# Patient Record
Sex: MALE | Race: WHITE | NOT HISPANIC OR LATINO | Employment: FULL TIME | URBAN - METROPOLITAN AREA
[De-identification: names, ages, dates, MRNs, and addresses within clinical notes are randomized per-mention and may not be internally consistent; named-entity substitution may affect disease eponyms.]

---

## 2018-08-07 ENCOUNTER — HOSPITAL ENCOUNTER (EMERGENCY)
Facility: MEDICAL CENTER | Age: 43
End: 2018-08-07
Attending: EMERGENCY MEDICINE
Payer: MEDICAID

## 2018-08-07 VITALS
HEART RATE: 104 BPM | BODY MASS INDEX: 27.41 KG/M2 | TEMPERATURE: 97.5 F | WEIGHT: 185.63 LBS | DIASTOLIC BLOOD PRESSURE: 101 MMHG | OXYGEN SATURATION: 97 % | SYSTOLIC BLOOD PRESSURE: 144 MMHG | RESPIRATION RATE: 16 BRPM

## 2018-08-07 DIAGNOSIS — K42.9 UMBILICAL HERNIA WITHOUT OBSTRUCTION AND WITHOUT GANGRENE: ICD-10-CM

## 2018-08-07 PROCEDURE — 99283 EMERGENCY DEPT VISIT LOW MDM: CPT

## 2018-08-07 RX ORDER — ONDANSETRON 4 MG/1
4 TABLET, ORALLY DISINTEGRATING ORAL EVERY 8 HOURS PRN
Qty: 10 TAB | Refills: 2 | Status: SHIPPED | OUTPATIENT
Start: 2018-08-07 | End: 2020-06-02

## 2018-08-07 ASSESSMENT — PAIN SCALES - GENERAL: PAINLEVEL_OUTOF10: 6

## 2018-08-07 NOTE — ED TRIAGE NOTES
"Pt comes in complaining of an umbilical hernia. Pt stating \"its irritating\" pt asking for a referral and nausea medication   "

## 2018-08-07 NOTE — DISCHARGE INSTRUCTIONS
Umbilical Hernia, Adult  A hernia is a bulge of tissue that pushes through an opening between muscles. An umbilical hernia happens in the abdomen, near the belly button (umbilicus). The hernia may contain tissues from the small intestine, large intestine, or fatty tissue covering the intestines (omentum). Umbilical hernias in adults tend to get worse over time, and they require surgical treatment.  There are several types of umbilical hernias. You may have:  · A hernia located just above or below the umbilicus (indirect hernia). This is the most common type of umbilical hernia in adults.  · A hernia that forms through an opening formed by the umbilicus (direct hernia).  · A hernia that comes and goes (reducible hernia). A reducible hernia may be visible only when you strain, lift something heavy, or cough. This type of hernia can be pushed back into the abdomen (reduced).  · A hernia that traps abdominal tissue inside the hernia (incarcerated hernia). This type of hernia cannot be reduced.  · A hernia that cuts off blood flow to the tissues inside the hernia (strangulated hernia). The tissues can start to die if this happens. This type of hernia requires emergency treatment.  What are the causes?  An umbilical hernia happens when tissue inside the abdomen presses on a weak area of the abdominal muscles.  What increases the risk?  You may have a greater risk of this condition if you:  · Are obese.  · Have had several pregnancies.  · Have a buildup of fluid inside your abdomen (ascites).  · Have had surgery that weakens the abdominal muscles.  What are the signs or symptoms?  The main symptom of this condition is a painless bulge at or near the belly button. A reducible hernia may be visible only when you strain, lift something heavy, or cough. Other symptoms may include:  · Dull pain.  · A feeling of pressure.  Symptoms of a strangulated hernia may include:  · Pain that gets increasingly worse.  · Nausea and  vomiting.  · Pain when pressing on the hernia.  · Skin over the hernia becoming red or purple.  · Constipation.  · Blood in the stool.  How is this diagnosed?  This condition may be diagnosed based on:  · A physical exam. You may be asked to cough or strain while standing. These actions increase the pressure inside your abdomen and force the hernia through the opening in your muscles. Your health care provider may try to reduce the hernia by pressing on it.  · Your symptoms and medical history.  How is this treated?  Surgery is the only treatment for an umbilical hernia. Surgery for a strangulated hernia is done as soon as possible. If you have a small hernia that is not incarcerated, you may need to lose weight before having surgery.  Follow these instructions at home:  · Lose weight, if told by your health care provider.  · Do not try to push the hernia back in.  · Watch your hernia for any changes in color or size. Tell your health care provider if any changes occur.  · You may need to avoid activities that increase pressure on your hernia.  · Do not lift anything that is heavier than 10 lb (4.5 kg) until your health care provider says that this is safe.  · Take over-the-counter and prescription medicines only as told by your health care provider.  · Keep all follow-up visits as told by your health care provider. This is important.  Contact a health care provider if:  · Your hernia gets larger.  · Your hernia becomes painful.  Get help right away if:  · You develop sudden, severe pain near the area of your hernia.  · You have pain as well as nausea or vomiting.  · You have pain and the skin over your hernia changes color.  · You develop a fever.  This information is not intended to replace advice given to you by your health care provider. Make sure you discuss any questions you have with your health care provider.  Document Released: 05/19/2017 Document Revised: 08/20/2017 Document Reviewed: 05/19/2017  [employer]  Interactive Patient Education © 2017 [employer]

## 2018-08-07 NOTE — ED NOTES
Patient verbalized understanding of dc and follow up. No acute distress noted. Prescription x 1 given.

## 2018-08-07 NOTE — ED PROVIDER NOTES
"ED Provider Note  CHIEF COMPLAINT  Chief Complaint   Patient presents with   • Hernia       HPI  Tyron Milligan is a 42 y.o. male who presentsFor evaluation of intermittent pain around his umbilicus. The patient thinks he is developed an umbilical hernia. He 1st noticed this several months ago. He reports when he is doing some heavy lifting and after prolonged standing it \"pops out \"and he pushes it back in. He denies any abdominal distention I fevers or chills black or bloody stools. He has been passing gas having normal bowel movements. He reports this comes and goes and he gets nauseous. He is mostly concerned about getting a referral to a surgeon. He has no other significant medical or surgical history. Specifically no abdominal surgeries in the past.     REVIEW OF SYSTEMS  See HPI for further details. No high fevers hematemesis hematochezia night sweats weight loss All other systems are negative.     PAST MEDICAL HISTORY  No past medical history on file.  None reported  FAMILY HISTORY  Noncontributory    SOCIAL HISTORY  Social History     Social History   • Marital status: Unknown     Spouse name: N/A   • Number of children: N/A   • Years of education: N/A     Social History Main Topics   • Smoking status: Current Every Day Smoker     Packs/day: 0.50     Years: 10.00   • Smokeless tobacco: Not on file   • Alcohol use Yes      Comment: occ   • Drug use: No   • Sexual activity: Not on file     Other Topics Concern   • Not on file     Social History Narrative   • No narrative on file     Smokes cigarettes no IV drugs  SURGICAL HISTORY  No past surgical history on file.  No major surgeries  CURRENT MEDICATIONS  Home Medications    **Home medications have not yet been reviewed for this encounter**       No regular meds  ALLERGIES  Allergies   Allergen Reactions   • Demerol Rash     phlebitis       PHYSICAL EXAM  VITAL SIGNS: /101   Pulse (!) 104   Temp 36.4 °C (97.5 °F)   Resp 16   Wt 84.2 kg (185 lb 10 " oz)   SpO2 97%   BMI 27.41 kg/m²       Constitutional: Well developed, Well nourished, No acute distress, Non-toxic appearance.   HENT: Normocephalic, Atraumatic, Bilateral external ears normal, Oropharynx moist, No oral exudates, Nose normal.   Eyes: PERRLA, EOMI, Conjunctiva normal, No discharge.   Neck: Normal range of motion, No tenderness, Supple, No stridor.   Cardiovascular: Normal heart rate, Normal rhythm, No murmurs, No rubs, No gallops.   Thorax & Lungs: Normal breath sounds, No respiratory distress, No wheezing, No chest tenderness.   Abdomen: Bowel sounds normal,2 x 2 centimeter reducible mass overlying the umbilicus consistent with a reducible umbilical hernia without obstruction or gangrene  Skin: Warm, Dry, No erythema, No rash.   Extremities: Intact distal pulses, No edema, No tenderness, No cyanosis, No clubbing.   Neurologic: Alert & oriented x 3, Normal motor function, Normal sensory function, No focal deficits noted.   Psychiatric: Affect normal, Judgment normal, Mood normal.       COURSE & MEDICAL DECISION MAKING  Pertinent Labs & Imaging studies reviewed. (See chart for details)  Patient has a history and physical exam strongly suggest reducible nonincarcerated umbilical hernia. It is around 2.5 cm at this time. The natural history is that it will likely require surgical intervention for very clearly not emergently right now. I will give him a prescription of Zofran as needed I advised him to avoid heavy lifting and he will be referred to general surgery    FINAL IMPRESSION  1.   1. Umbilical hernia without obstruction and without gangrene             Electronically signed by: Tommy Patel, 8/7/2018 12:35 PM

## 2018-08-08 ENCOUNTER — OFFICE VISIT (OUTPATIENT)
Dept: MEDICAL GROUP | Facility: CLINIC | Age: 43
End: 2018-08-08
Payer: MEDICAID

## 2018-08-08 VITALS
SYSTOLIC BLOOD PRESSURE: 138 MMHG | TEMPERATURE: 98.5 F | OXYGEN SATURATION: 95 % | RESPIRATION RATE: 18 BRPM | DIASTOLIC BLOOD PRESSURE: 80 MMHG | BODY MASS INDEX: 27.55 KG/M2 | WEIGHT: 186 LBS | HEIGHT: 69 IN | HEART RATE: 86 BPM

## 2018-08-08 DIAGNOSIS — M54.42 ACUTE LEFT-SIDED LOW BACK PAIN WITH LEFT-SIDED SCIATICA: ICD-10-CM

## 2018-08-08 DIAGNOSIS — K21.9 GASTROESOPHAGEAL REFLUX DISEASE WITHOUT ESOPHAGITIS: ICD-10-CM

## 2018-08-08 DIAGNOSIS — K42.9 UMBILICAL HERNIA WITHOUT OBSTRUCTION AND WITHOUT GANGRENE: ICD-10-CM

## 2018-08-08 PROBLEM — M54.40 ACUTE LEFT-SIDED LOW BACK PAIN WITH SCIATICA: Status: ACTIVE | Noted: 2018-08-08

## 2018-08-08 PROCEDURE — 99214 OFFICE O/P EST MOD 30 MIN: CPT | Performed by: FAMILY MEDICINE

## 2018-08-08 RX ORDER — GABAPENTIN 300 MG/1
300 CAPSULE ORAL 3 TIMES DAILY PRN
Qty: 90 CAP | Refills: 2 | Status: SHIPPED | OUTPATIENT
Start: 2018-08-08 | End: 2018-09-20

## 2018-08-08 RX ORDER — FAMOTIDINE 40 MG/1
40 TABLET, FILM COATED ORAL EVERY EVENING
Qty: 30 TAB | Refills: 2 | Status: SHIPPED | OUTPATIENT
Start: 2018-08-08 | End: 2020-06-02

## 2018-08-08 RX ORDER — GABAPENTIN 300 MG/1
300 CAPSULE ORAL 3 TIMES DAILY PRN
Qty: 90 CAP | Refills: 2 | Status: SHIPPED | OUTPATIENT
Start: 2018-08-08 | End: 2018-08-08 | Stop reason: SDUPTHER

## 2018-08-08 ASSESSMENT — PATIENT HEALTH QUESTIONNAIRE - PHQ9: CLINICAL INTERPRETATION OF PHQ2 SCORE: 0

## 2018-08-08 ASSESSMENT — PAIN SCALES - GENERAL: PAINLEVEL: 4=SLIGHT-MODERATE PAIN

## 2018-08-08 NOTE — ASSESSMENT & PLAN NOTE
Had noticed tender lump in his belly button, seen ER the other night. Diagnosed with umbilical hernia. Says it causes him quite a bit of pain first thing in AM, to point of nausea and retching. Denies any postnasal drip or reflux sxs, though.

## 2018-08-08 NOTE — PROGRESS NOTES
Complaint: Medication refill     Subjective:     Tyron Milligan is a 42 y.o. male here today with a couple issues.    Seen yesterday at [facility] ER; I reviewed the visit note and data.    Umbilical hernia  Had noticed tender lump in his belly button, seen ER the other night. Diagnosed with umbilical hernia. Says it causes him quite a bit of pain first thing in AM, to point of nausea and retching. Denies any postnasal drip or reflux sxs, though.    Acute left-sided low back pain with sciatica  For past couple weeks flare of low back pain, radiating down left leg. Hx failed back surgery. Says gabapentin has helped in past.    GERD (gastroesophageal reflux disease)  Nausea an retching in AM. Denies any reflux or heartburn.     Works in [employer]. Smokes.    Current medicines (including changes today)  Current Outpatient Prescriptions   Medication Sig Dispense Refill   • famotidine (PEPCID) 40 MG Tab Take 1 Tab by mouth every evening. 30 Tab 2   • gabapentin (NEURONTIN) 300 MG Cap Take 1 Cap by mouth 3 times a day as needed. 90 Cap 2   • ondansetron (ZOFRAN ODT) 4 MG TABLET DISPERSIBLE Take 1 Tab by mouth every 8 hours as needed. 10 Tab 2     No current facility-administered medications for this visit.      He  has a past medical history of Back pain.    Health Maintenance: needs immunizations      Allergies: Demerol    Current Outpatient Prescriptions Ordered in [facility]   Medication Sig Dispense Refill   • famotidine (PEPCID) 40 MG Tab Take 1 Tab by mouth every evening. 30 Tab 2   • gabapentin (NEURONTIN) 300 MG Cap Take 1 Cap by mouth 3 times a day as needed. 90 Cap 2   • ondansetron (ZOFRAN ODT) 4 MG TABLET DISPERSIBLE Take 1 Tab by mouth every 8 hours as needed. 10 Tab 2     No current Epic-ordered facility-administered medications on file.        Past Medical History:   Diagnosis Date   • Back pain        Past Surgical History:   Procedure Laterality Date   • LAMINOTOMY         Social History   Substance Use Topics  "  • Smoking status: Current Every Day Smoker     Packs/day: 0.50     Years: 10.00   • Smokeless tobacco: Never Used      Comment: 10 cig/day   • Alcohol use Yes      Comment: occ       Social History     Social History Narrative   • No narrative on file       Family History   Problem Relation Age of Onset   • Cancer Mother         throat   • Heart Disease Father          ROS Positive for nausea and vomiting in AMs, painful umbilical hernia, pain in left lower back down leg, rotten teeth.  Patient denies any fever, chills, unintentional weight gain/loss, fatigue, stroke symptoms, dizziness, headache, nasal congestion, sore-throat, cough, heartburn, chest pain, difficulty breathing,  diarrhea/constipation, burning with urination or frequency, joint pain, skin rashes, depression or anxiety.       Objective:     Blood pressure 138/80, pulse 86, temperature 36.9 °C (98.5 °F), resp. rate 18, height 1.753 m (5' 9\"), weight 84.4 kg (186 lb), SpO2 95 %. Body mass index is 27.47 kg/m².   Physical Exam:  Constitutional: Alert, no distress.  Skin: Warm, dry, good turgor, no rashes in visible areas.  Eye: Equal, round and reactive, conjunctiva clear, lids normal.  ENMT: Lips without lesions, poor dentition, oropharynx clear.  Neck: Trachea midline, no masses, no thyromegaly. No cervical or supraclavicular lymphadenopathy  Respiratory: Unlabored respiratory effort, lungs clear to auscultation, no wheezes, no ronchi.  Cardiovascular: Normal S1, S2, no murmur, no extremity edema.  Abdomen: Soft, tender hazelnut-size knot right side of bellybutton, no hepatosplenomegaly.  Psych: Alert and oriented x3, appropriate affect and mood.        Assessment and Plan:   The following treatment plan was discussed    1. Acute left-sided low back pain with left-sided sciatica  Acute problem. Recommend starting back in evening to check for drowsiness, since he hasn't been on med in a while. Then increase to bid as needed.  - gabapentin (NEURONTIN) " 300 MG Cap; Take 1 Cap by mouth 3 times a day as needed.  Dispense: 90 Cap; Refill: 2    2. Gastroesophageal reflux disease without esophagitis  New problem. Sxs suggestive of dyspepsia vs GERD. Empiric trial.  - famotidine (PEPCID) 40 MG Tab; Take 1 Tab by mouth every evening.  Dispense: 30 Tab; Refill: 2    3. Umbilical hernia without obstruction and without gangrene  New problem. Patient given names of surgeons in [place] to contact.  - REFERRAL TO GENERAL SURGERY    Recommended he quit smoking.    Given tel # [facility].    Followup: Return if symptoms worsen or fail to improve.    Please note that this dictation was created using voice recognition software. I have made every reasonable attempt to correct obvious errors, but I expect that there are errors of grammar and possibly content that I did not discover before finalizing the note.

## 2018-08-08 NOTE — ASSESSMENT & PLAN NOTE
For past couple weeks flare of low back pain, radiating down left leg. Hx failed back surgery. Says gabapentin has helped in past.

## 2018-08-16 DIAGNOSIS — K42.9 UMBILICAL HERNIA WITHOUT OBSTRUCTION AND WITHOUT GANGRENE: ICD-10-CM

## 2018-09-12 ENCOUNTER — HOSPITAL ENCOUNTER (OUTPATIENT)
Dept: RADIOLOGY | Facility: MEDICAL CENTER | Age: 43
End: 2018-09-12
Attending: FAMILY MEDICINE
Payer: MEDICAID

## 2018-09-12 DIAGNOSIS — K42.9 UMBILICAL HERNIA WITHOUT OBSTRUCTION AND WITHOUT GANGRENE: ICD-10-CM

## 2018-09-12 PROCEDURE — 76705 ECHO EXAM OF ABDOMEN: CPT

## 2018-09-19 ENCOUNTER — APPOINTMENT (OUTPATIENT)
Dept: SCHEDULING | Facility: IMAGING CENTER | Age: 43
End: 2018-09-19
Payer: MEDICAID

## 2018-09-20 ENCOUNTER — OFFICE VISIT (OUTPATIENT)
Dept: MEDICAL GROUP | Facility: CLINIC | Age: 43
End: 2018-09-20
Payer: MEDICAID

## 2018-09-20 VITALS
WEIGHT: 186 LBS | HEIGHT: 69 IN | OXYGEN SATURATION: 95 % | DIASTOLIC BLOOD PRESSURE: 78 MMHG | BODY MASS INDEX: 27.55 KG/M2 | SYSTOLIC BLOOD PRESSURE: 126 MMHG | HEART RATE: 100 BPM | RESPIRATION RATE: 18 BRPM | TEMPERATURE: 98 F

## 2018-09-20 DIAGNOSIS — M54.42 CHRONIC LEFT-SIDED LOW BACK PAIN WITH LEFT-SIDED SCIATICA: ICD-10-CM

## 2018-09-20 DIAGNOSIS — G89.29 CHRONIC LEFT-SIDED LOW BACK PAIN WITH LEFT-SIDED SCIATICA: ICD-10-CM

## 2018-09-20 PROCEDURE — 99213 OFFICE O/P EST LOW 20 MIN: CPT | Performed by: FAMILY MEDICINE

## 2018-09-20 RX ORDER — GABAPENTIN 600 MG/1
600 TABLET ORAL 3 TIMES DAILY
Qty: 90 TAB | Refills: 2 | Status: SHIPPED | OUTPATIENT
Start: 2018-09-20 | End: 2020-06-02

## 2018-09-20 RX ORDER — GABAPENTIN 300 MG/1
600 CAPSULE ORAL 3 TIMES DAILY PRN
Qty: 90 CAP | Refills: 2 | Status: SHIPPED | OUTPATIENT
Start: 2018-09-20 | End: 2018-09-20

## 2018-09-20 ASSESSMENT — PAIN SCALES - GENERAL: PAINLEVEL: 6=MODERATE PAIN

## 2018-09-20 NOTE — PROGRESS NOTES
Complaint: ***     Subjective:     Tyron Milligan is a 43 y.o. male here today for ***    Chronic left-sided low back pain with sciatica  Gabapentin 300 mg tid taking off the edge only. Has not tried increasing dose. Wants to be referred to see pain specialist. Last surgery about 7 years ago by Dr. Andre. Last MRI years ago as well. Reports tingling/numbness in left leg, gives way off & on.     ***    Current medicines (including changes today)  Current Outpatient Prescriptions   Medication Sig Dispense Refill   • famotidine (PEPCID) 40 MG Tab Take 1 Tab by mouth every evening. 30 Tab 2   • ondansetron (ZOFRAN ODT) 4 MG TABLET DISPERSIBLE Take 1 Tab by mouth every 8 hours as needed. 10 Tab 2     No current facility-administered medications for this visit.      He  has a past medical history of Back pain.    Health Maintenance: {COMPLETED:452899}      Allergies: Demerol    Current Outpatient Prescriptions Ordered in [facility]   Medication Sig Dispense Refill   • famotidine (PEPCID) 40 MG Tab Take 1 Tab by mouth every evening. 30 Tab 2   • ondansetron (ZOFRAN ODT) 4 MG TABLET DISPERSIBLE Take 1 Tab by mouth every 8 hours as needed. 10 Tab 2     No current Epic-ordered facility-administered medications on file.        Past Medical History:   Diagnosis Date   • Back pain        Past Surgical History:   Procedure Laterality Date   • LAMINOTOMY         Social History   Substance Use Topics   • Smoking status: Current Every Day Smoker     Packs/day: 0.00     Years: 10.00   • Smokeless tobacco: Never Used      Comment: 09/20/18 - 1 cig/day if that   • Alcohol use Yes      Comment: occ       Social History     Social History Narrative   • No narrative on file       Family History   Problem Relation Age of Onset   • Cancer Mother         throat   • Heart Disease Father          ROS ***  Patient denies any fever, chills, unintentional weight gain/loss, fatigue, stroke symptoms, dizziness, headache, nasal congestion,  "sore-throat, cough, heartburn, chest pain, difficulty breathing, abdominal discomfort, diarrhea/constipation, burning with urination or frequency, joint or back pain, skin rashes, depression or anxiety.       Objective:     Blood pressure 126/78, pulse 100, temperature 36.7 °C (98 °F), temperature source Temporal, resp. rate 18, height 1.753 m (5' 9\"), weight 84.4 kg (186 lb), SpO2 95 %. Body mass index is 27.47 kg/m².   Physical Exam:  Constitutional: Alert, no distress.  Skin: Warm, dry, good turgor, no rashes in visible areas.  Eye: Equal, round and reactive, conjunctiva clear, lids normal.  ENMT: Lips without lesions, good dentition, oropharynx clear.  Neck: Trachea midline, no masses, no thyromegaly. No cervical or supraclavicular lymphadenopathy  Respiratory: Unlabored respiratory effort, lungs clear to auscultation, no wheezes, no ronchi.  Cardiovascular: Normal S1, S2, no murmur, no extremity edema.  Abdomen: Soft, non-tender, no masses, no hepatosplenomegaly.  Psych: Alert and oriented x3, appropriate affect and mood.        Assessment and Plan:   The following treatment plan was discussed    1. Chronic left-sided low back pain with left-sided sciatica  ***      Followup: No Follow-up on file.    Please note that this dictation was created using voice recognition software. I have made every reasonable attempt to correct obvious errors, but I expect that there are errors of grammar and possibly content that I did not discover before finalizing the note.           "

## 2018-09-20 NOTE — PROGRESS NOTES
Complaint: ***     Subjective:     Tyron Milligan is a 43 y.o. male here today for ***    Chronic left-sided low back pain with sciatica  Gabapentin 300 mg tid taking off the edge only. Has not tried increasing dose. Wants to be referred to see pain specialist. Last surgery about 7 years ago by Dr. Andre. Last MRI years ago as well. Reports tingling/numbness in left leg, gives way off & on.     ***    Current medicines (including changes today)  Current Outpatient Prescriptions   Medication Sig Dispense Refill   • gabapentin (NEURONTIN) 300 MG Cap Take 2 Caps by mouth 3 times a day as needed. 90 Cap 2   • famotidine (PEPCID) 40 MG Tab Take 1 Tab by mouth every evening. 30 Tab 2   • ondansetron (ZOFRAN ODT) 4 MG TABLET DISPERSIBLE Take 1 Tab by mouth every 8 hours as needed. 10 Tab 2     No current facility-administered medications for this visit.      He  has a past medical history of Back pain.    Health Maintenance: {COMPLETED:708792}      Allergies: Demerol    Current Outpatient Prescriptions Ordered in [facility]   Medication Sig Dispense Refill   • gabapentin (NEURONTIN) 300 MG Cap Take 2 Caps by mouth 3 times a day as needed. 90 Cap 2   • famotidine (PEPCID) 40 MG Tab Take 1 Tab by mouth every evening. 30 Tab 2   • ondansetron (ZOFRAN ODT) 4 MG TABLET DISPERSIBLE Take 1 Tab by mouth every 8 hours as needed. 10 Tab 2     No current Epic-ordered facility-administered medications on file.        Past Medical History:   Diagnosis Date   • Back pain        Past Surgical History:   Procedure Laterality Date   • LAMINOTOMY         Social History   Substance Use Topics   • Smoking status: Current Every Day Smoker     Packs/day: 0.00     Years: 10.00   • Smokeless tobacco: Never Used      Comment: 09/20/18 - 1 cig/day if that   • Alcohol use Yes      Comment: occ       Social History     Social History Narrative   • No narrative on file       Family History   Problem Relation Age of Onset   • Cancer Mother         throat  "  • Heart Disease Father          ROS ***  Patient denies any fever, chills, unintentional weight gain/loss, fatigue, stroke symptoms, dizziness, headache, nasal congestion, sore-throat, cough, heartburn, chest pain, difficulty breathing, abdominal discomfort, diarrhea/constipation, burning with urination or frequency, joint or back pain, skin rashes, depression or anxiety.       Objective:     Blood pressure 126/78, pulse 100, temperature 36.7 °C (98 °F), temperature source Temporal, resp. rate 18, height 1.753 m (5' 9\"), weight 84.4 kg (186 lb), SpO2 95 %. Body mass index is 27.47 kg/m².   Physical Exam:  Constitutional: Alert, no distress.  Skin: Warm, dry, good turgor, no rashes in visible areas.  Eye: Equal, round and reactive, conjunctiva clear, lids normal.  ENMT: Lips without lesions, good dentition, oropharynx clear.  Neck: Trachea midline, no masses, no thyromegaly. No cervical or supraclavicular lymphadenopathy  Respiratory: Unlabored respiratory effort, lungs clear to auscultation, no wheezes, no ronchi.  Cardiovascular: Normal S1, S2, no murmur, no extremity edema.  Abdomen: Soft, non-tender, no masses, no hepatosplenomegaly.  Psych: Alert and oriented x3, appropriate affect and mood.        Assessment and Plan:   The following treatment plan was discussed    1. Acute left-sided low back pain with left-sided sciatica  ***  - MR-LUMBAR SPINE-W/O; Future  - gabapentin (NEURONTIN) 300 MG Cap; Take 2 Caps by mouth 3 times a day as needed.  Dispense: 90 Cap; Refill: 2    2. Chronic left-sided low back pain with left-sided sciatica  ***      Followup: No Follow-up on file.    Please note that this dictation was created using voice recognition software. I have made every reasonable attempt to correct obvious errors, but I expect that there are errors of grammar and possibly content that I did not discover before finalizing the note.           "

## 2018-09-20 NOTE — PROGRESS NOTES
Complaint: Wants referral to pain clinic     Subjective:     Tyron Milligan is a 43 y.o. male here today for f/u.    Chronic left-sided low back pain with sciatica  Gabapentin 300 mg tid taking off the edge only. Has not tried increasing dose. Wants to be referred to see pain specialist. Last surgery about 7 years ago by Dr. Andre. Last MRI years ago as well. Reports tingling/numbness in left leg, gives way off & on.     No other concerns or complaints.    Current medicines (including changes today)  Current Outpatient Prescriptions   Medication Sig Dispense Refill   • gabapentin (NEURONTIN) 600 MG tablet Take 1 Tab by mouth 3 times a day. 90 Tab 2   • famotidine (PEPCID) 40 MG Tab Take 1 Tab by mouth every evening. 30 Tab 2   • ondansetron (ZOFRAN ODT) 4 MG TABLET DISPERSIBLE Take 1 Tab by mouth every 8 hours as needed. 10 Tab 2     No current facility-administered medications for this visit.      He  has a past medical history of Back pain.    Health Maintenance: declines flu shot      Allergies: Demerol    Current Outpatient Prescriptions Ordered in [facility]   Medication Sig Dispense Refill   • gabapentin (NEURONTIN) 600 MG tablet Take 1 Tab by mouth 3 times a day. 90 Tab 2   • famotidine (PEPCID) 40 MG Tab Take 1 Tab by mouth every evening. 30 Tab 2   • ondansetron (ZOFRAN ODT) 4 MG TABLET DISPERSIBLE Take 1 Tab by mouth every 8 hours as needed. 10 Tab 2     No current Epic-ordered facility-administered medications on file.        Past Medical History:   Diagnosis Date   • Back pain        Past Surgical History:   Procedure Laterality Date   • LAMINOTOMY         Social History   Substance Use Topics   • Smoking status: Current Every Day Smoker     Packs/day: 0.00     Years: 10.00   • Smokeless tobacco: Never Used      Comment: 09/20/18 - 1 cig/day if that   • Alcohol use Yes      Comment: occ       Social History     Social History Narrative   • No narrative on file       Family History   Problem Relation Age of  "Onset   • Cancer Mother         throat   • Heart Disease Father          ROS Pain in lower back and down leg leg with tingling/numbness in leg.  Patient denies any fever, chills, unintentional weight gain/loss, fatigue, stroke symptoms, dizziness, headache, nasal congestion, sore-throat, cough, heartburn, chest pain, difficulty breathing, abdominal discomfort, diarrhea/constipation, burning with urination or frequency, joint pain, skin rashes, depression or anxiety.       Objective:     Blood pressure 126/78, pulse 100, temperature 36.7 °C (98 °F), temperature source Temporal, resp. rate 18, height 1.753 m (5' 9\"), weight 84.4 kg (186 lb), SpO2 95 %. Body mass index is 27.47 kg/m².   Physical Exam:  Constitutional: Alert, no distress.  No formal exam today  Psych: Alert and oriented x3, appropriate affect and mood.        Assessment and Plan:   The following treatment plan was discussed    1. Chronic left-sided low back pain with left-sided sciatica  Chronic problem. Will increase his gabapentin to 600 mg tid. Get MRI, then refer to pain clinic.  - MR-LUMBAR SPINE-W/O; Future  - gabapentin (NEURONTIN) 600 MG tablet; Take 1 Tab by mouth 3 times a day.  Dispense: 90 Tab; Refill: 2      Followup: Return if symptoms worsen or fail to improve.    Please note that this dictation was created using voice recognition software. I have made every reasonable attempt to correct obvious errors, but I expect that there are errors of grammar and possibly content that I did not discover before finalizing the note.           "

## 2018-09-20 NOTE — ASSESSMENT & PLAN NOTE
Gabapentin 300 mg tid taking off the edge only. Has not tried increasing dose. Wants to be referred to see pain specialist. Last surgery about 7 years ago by Dr. Andre. Last MRI years ago as well. Reports tingling/numbness in left leg, gives way off & on.

## 2018-10-04 ENCOUNTER — HOSPITAL ENCOUNTER (OUTPATIENT)
Dept: RADIOLOGY | Facility: MEDICAL CENTER | Age: 43
End: 2018-10-04
Attending: SURGERY
Payer: MEDICAID

## 2018-10-04 ENCOUNTER — HOSPITAL ENCOUNTER (OUTPATIENT)
Dept: RADIOLOGY | Facility: MEDICAL CENTER | Age: 43
End: 2018-10-04
Attending: FAMILY MEDICINE
Payer: MEDICAID

## 2018-10-04 DIAGNOSIS — M54.42 CHRONIC LEFT-SIDED LOW BACK PAIN WITH LEFT-SIDED SCIATICA: ICD-10-CM

## 2018-10-04 DIAGNOSIS — G89.29 CHRONIC LEFT-SIDED LOW BACK PAIN WITH LEFT-SIDED SCIATICA: ICD-10-CM

## 2018-10-04 DIAGNOSIS — R10.9 STOMACH ACHE: ICD-10-CM

## 2018-10-04 PROCEDURE — 76700 US EXAM ABDOM COMPLETE: CPT

## 2018-10-04 PROCEDURE — 72148 MRI LUMBAR SPINE W/O DYE: CPT

## 2018-11-05 ENCOUNTER — OFFICE VISIT (OUTPATIENT)
Dept: PHYSICAL MEDICINE AND REHAB | Facility: MEDICAL CENTER | Age: 43
End: 2018-11-05
Payer: MEDICAID

## 2018-11-05 VITALS
HEIGHT: 71 IN | BODY MASS INDEX: 25.68 KG/M2 | OXYGEN SATURATION: 96 % | TEMPERATURE: 98.2 F | HEART RATE: 118 BPM | WEIGHT: 183.42 LBS | DIASTOLIC BLOOD PRESSURE: 80 MMHG | SYSTOLIC BLOOD PRESSURE: 130 MMHG

## 2018-11-05 DIAGNOSIS — M79.18 MYOFASCIAL PAIN: ICD-10-CM

## 2018-11-05 DIAGNOSIS — M51.26 PROTRUSION OF LUMBAR INTERVERTEBRAL DISC: ICD-10-CM

## 2018-11-05 DIAGNOSIS — M51.36 DDD (DEGENERATIVE DISC DISEASE), LUMBAR: ICD-10-CM

## 2018-11-05 DIAGNOSIS — K21.9 GASTROESOPHAGEAL REFLUX DISEASE WITHOUT ESOPHAGITIS: ICD-10-CM

## 2018-11-05 DIAGNOSIS — M25.552 LEFT HIP PAIN: ICD-10-CM

## 2018-11-05 DIAGNOSIS — K42.9 UMBILICAL HERNIA WITHOUT OBSTRUCTION AND WITHOUT GANGRENE: ICD-10-CM

## 2018-11-05 DIAGNOSIS — Z98.890 HISTORY OF LUMBAR SURGERY: ICD-10-CM

## 2018-11-05 DIAGNOSIS — R10.9 ABDOMINAL PAIN, UNSPECIFIED ABDOMINAL LOCATION: ICD-10-CM

## 2018-11-05 DIAGNOSIS — R11.0 NAUSEA: ICD-10-CM

## 2018-11-05 DIAGNOSIS — M54.50 LUMBOSACRAL PAIN: ICD-10-CM

## 2018-11-05 DIAGNOSIS — M47.816 LUMBAR SPONDYLOSIS: ICD-10-CM

## 2018-11-05 DIAGNOSIS — M62.838 MUSCLE SPASM: ICD-10-CM

## 2018-11-05 DIAGNOSIS — M54.16 LUMBAR RADICULITIS: ICD-10-CM

## 2018-11-05 PROCEDURE — 99204 OFFICE O/P NEW MOD 45 MIN: CPT | Performed by: PHYSICAL MEDICINE & REHABILITATION

## 2018-11-05 RX ORDER — IBUPROFEN 600 MG/1
TABLET ORAL
Refills: 0 | COMMUNITY
Start: 2018-10-01 | End: 2020-06-02

## 2018-11-05 RX ORDER — BACLOFEN 10 MG/1
TABLET ORAL
Qty: 15 TAB | Refills: 1 | Status: SHIPPED | OUTPATIENT
Start: 2018-11-05 | End: 2018-12-14

## 2018-11-05 RX ORDER — HYDROCODONE BITARTRATE AND ACETAMINOPHEN 10; 325 MG/1; MG/1
TABLET ORAL
Refills: 0 | COMMUNITY
Start: 2018-10-01 | End: 2018-11-05

## 2018-11-05 ASSESSMENT — ENCOUNTER SYMPTOMS
NAUSEA: 1
CHILLS: 0
ABDOMINAL PAIN: 1
HEARTBURN: 1
EYE PAIN: 0
INSOMNIA: 1
TINGLING: 1
MYALGIAS: 1
SHORTNESS OF BREATH: 0
FEVER: 0
SENSORY CHANGE: 1
ORTHOPNEA: 0
SPUTUM PRODUCTION: 0
PHOTOPHOBIA: 0
CLAUDICATION: 0
BACK PAIN: 1

## 2018-11-05 NOTE — PROGRESS NOTES
Subjective:      Tyron Milligan is a 43 y.o. male who presents with New Patient    Chief complaint: Low back pain        HPI The patient notes long history of low back pain, notes prior history of physical activities, bending/lifting tasks, tried conservative care and ultimately underwent lumbar spine surgery in 2008 with Dr. Andre, [facility].      The patient notes good response with the surgery although was left with some residual left lower limb neuropathic symptoms, relatively controlled until had a flare of pain beginning approximately 6-7 months ago without specific event or trauma.    The patient now notes ongoing pain in the left greater than right lumbosacral region, notes radiating pain in the left lower limb in the L5-S1 distribution, with neuropathic component, worse with activities, limiting his ability to function.    The patient also notes left hip area pain.    Regarding comorbid medical issues, the patient has evaluation in progress regarding abdominal pain, nausea, umbilical hernia, general surgery consulted.    The patient has had prior treatment with medications, including NSAIDs.  He has remotely been to physical therapy.  He has remotely tried injections by outside provider, records pending per no acute changes with bowel/bladder noted.  No acute changes with strength noted.  He is making an effort with home exercise program as tolerated.  The ongoing pain limits his ability to function.  He is inquiring about additional treatment options.      MEDICAL RECORDS REVIEW/DATA REVIEW: Reviewed in epic.    Records Reviewed: Reviewed referring provider notes.  Reviewed general surgery notes regarding from 9/2018.  Reviewed notes some [facility] from 2015.    I reviewed medications.     I reviewed  profile 11/5/2018    I reviewed diagnostic studies:     I reviewed radiographs.     Reviewed MRI lumbar spine 10/2018, I reviewed images and report, see below, note multilevel degenerative  changes, disc protrusions at L4-5 and L5-S1, foraminal stenosis at L4-5 and L5-S1, spondylosis, postoperative changes, discogenic edema at L5-S1, not reported on radiologist report    Reviewed abdominal ultrasound 10/2018.    I reviewed lab studies.  No recent blood work or urinalysis available for review.    I reviewed medical issues.     I reviewed family history: No neuromuscular disorders noted.    I reviewed social issues.  [facility]      10/4/2018 8:59 AM    HISTORY/REASON FOR EXAM:  Leg Numbness/Tingling  Lower back pain that radiates down the left side x 6 months. Hx of surgery 8 years ago    TECHNIQUE/EXAM DESCRIPTION:  MRI of the lumbar spine without contrast.    The study was performed on a [employer] 1.5 Mckenna MRI scanner.  T1 sagittal, T2 fast spin-echo sagittal, and T2 axial images were obtained of the lumbar spine.    COMPARISON:  None.    FINDINGS:    Alignment is anatomic.    The vertebral body heights are preserved.    Bone marrow signal is normal.    Conus is normal in caliber, signal, and location at L1.    Retroperitoneal and paravertebral soft tissues are normal.    L1-2:  No posterior disc contour abnormality. No facet arthropathy.  Patent spinal canal. Patent neuroforamen bilaterally.    L2-3:  No posterior disc contour abnormality. No facet arthropathy.  Patent spinal canal. Patent neuroforamen bilaterally.    L3-4:  No posterior disc contour abnormality. No facet arthropathy.  Patent spinal canal. Patent neuroforamen bilaterally.    L4-5:  Disc desiccation with right paracentral annular fissure and disc bulge. Mild facet arthropathy.  Patent spinal canal. Severe narrowing of the right neuroforamen.  Mild narrowing of the left neuroforamen.    L5-S1: Severe disc desiccation with left paracentral posterior disc bulge. Mild facet arthropathy.  Patent spinal canal. Moderate narrowing of the neuroforamina bilaterally.    Five non-rib bearing lumbar vertebral bodies are assumed with the L5  vertebral body articulating with the sacrum. Accurate numbering of the spine levels would require imaging of the thoracolumbar spine to count ribs.     Impression         Lower lumbar degenerative disc disease, most at L5-S1.    No spinal canal narrowing.    Severe narrowing of the right neuroforamen at L4-5.       PAST MEDICAL HISTORY:   Past Medical History:   Diagnosis Date   • Back pain    • GERD (gastroesophageal reflux disease)    • Nausea    • Umbilical hernia     General surgery consulted, [facility]       PAST SURGICAL HISTORY:    Past Surgical History:   Procedure Laterality Date   • LAMINOTOMY      approx 2008, Dr[facility]       ALLERGIES:  Demerol    MEDICATIONS:    Outpatient Encounter Prescriptions as of 11/5/2018   Medication Sig Dispense Refill   • ibuprofen (MOTRIN) 600 MG Tab   0   • baclofen (LIORESAL) 10 MG Tab Take 1/2 to 1 tablet by mouth in the evening as needed for muscle spasm 15 Tab 1   • gabapentin (NEURONTIN) 600 MG tablet Take 1 Tab by mouth 3 times a day. 90 Tab 2   • ondansetron (ZOFRAN ODT) 4 MG TABLET DISPERSIBLE Take 1 Tab by mouth every 8 hours as needed. 10 Tab 2   • [DISCONTINUED] HYDROcodone/acetaminophen (NORCO)  MG Tab   0   • famotidine (PEPCID) 40 MG Tab Take 1 Tab by mouth every evening. 30 Tab 2     No facility-administered encounter medications on file as of 11/5/2018.        SOCIAL HISTORY:    Social History     Social History   • Marital status: Single     Spouse name: N/A   • Number of children: N/A   • Years of education: N/A     Social History Main Topics   • Smoking status: Current Every Day Smoker     Packs/day: 0.00     Years: 10.00   • Smokeless tobacco: Never Used      Comment: 09/20/18 - 1 cig/day if that   • Alcohol use Yes      Comment: occ   • Drug use: No   • Sexual activity: Not on file     Other Topics Concern   •  Service No   • Blood Transfusions No   • Caffeine Concern Yes   • Occupational Exposure No   • Hobby Hazards No   •  "Sleep Concern Yes   • Stress Concern Yes   • Weight Concern No   • Special Diet No   • Back Care Yes   • Exercise No   • Bike Helmet Yes   • Seat Belt Yes   • Self-Exams No     Social History Narrative   • No narrative on file       Review of Systems   Constitutional: Negative for chills and fever.   HENT: Negative for ear pain and tinnitus.    Eyes: Negative for photophobia and pain.   Respiratory: Negative for sputum production and shortness of breath.    Cardiovascular: Negative for orthopnea and claudication.   Gastrointestinal: Positive for abdominal pain, heartburn and nausea.   Genitourinary: Negative for frequency and hematuria.   Musculoskeletal: Positive for back pain, joint pain and myalgias.   Skin: Negative.    Neurological: Positive for tingling and sensory change.   Psychiatric/Behavioral: The patient has insomnia.          Objective:     /80 (BP Location: Left arm, Patient Position: Sitting, BP Cuff Size: Adult)   Pulse (!) 118   Temp 36.8 °C (98.2 °F) (Temporal)   Ht 1.803 m (5' 11\")   Wt 83.2 kg (183 lb 6.8 oz)   SpO2 96%   BMI 25.58 kg/m²      Physical Exam   Constitutional: oriented to person, place, and time, appears well-developed and well-nourished.   HEENT: Normocephalic atraumatic, neck supple, no JVD noted, no masses noted, no meningeal signs noted  Lymphadenopathy: no cervical, supraclavicular, or inguinal lymphadenopathy noted  Cardiovascular: Intact distal pulses, including at ankles, no limb swelling noted  Pulmonary: No tachypnea noted, no accessory muscle use noted, no dyspnea noted  Abdominal: Umbilical hernia noted, no skin changes noted, soft, mild tender, exhibits no distension, no peritoneal signs, no HSM  Musculoskeletal:   Right hip: exhibits only mild tenderness. Minimal pain with range of motion testing  Left hip: exhibits mild tenderness.  Mild pain with range of motion testing  Thoracic: No significant tenderness, no significant pain with range of motion " testing  Lumbar back: Healed lumbar scar, exhibits decreased range of motion,  tenderness and  pain. straight leg testing produces posterior pelvic and thigh pain on the left, trigger points noted  Neurological: oriented to person, place, and time. Cranial nerves grossly intact, normal strength. Sensation intact distally. Reflexes 1+ in lower limbs, Gait mildly antalgic, reciprocal,. Able to heel/toe walk, No upper motor neuron signs evident  Skin: Skin is intact. no rashes or lesions noted  Psychiatric: normal mood and affect. speech is normal and behavior is normal. Judgment and thought content normal.        Assessment/Plan:       ASSESSMENT:    1.  Persistent lumbosacral pain, myofascial pain, lumbar radiculitis, lumbar disc protrusion, degenerative disc disease, foraminal stenosis, spondylosis, history of lumbar spine surgery approximately 2008    - DX-LUMBAR SPINE-4+ VIEWS; Future  - REFERRAL TO PHYSICAL THERAPY Reason for Therapy: Eval/Treat/Report  - Reviewed injection therapy with trigger point injections to treat the myofascial component to the ongoing pain, if not responded to more conservative care, patient to consider    2. Left hip pain, sprain strain    - DX-HIP-COMPLETE - UNILATERAL 2+ LEFT; Future  - REFERRAL TO PHYSICAL THERAPY Reason for Therapy: Eval/Treat/Report    3.  Comorbid medical issues, including umbilical hernia, abdominal pain, GERD, nausea, with care per primary care provider, and consultants    - Ordered screening laboratory study:  - CBC WITH DIFFERENTIAL; Future  - COMP METABOLIC PANEL; Future  - URINALYSIS; Future      DISCUSSION/PLAN:    - I discussed management options. I reviewed symptomatic care    - I would like to obtain/review additional records    - I reviewed home exercise program, with medical/spinal precautions, with general surgery activity/restrictions    - The patient can consider complementary trials with acupuncture, superficial massage therapy, or TENS unit    - I  reviewed medication monitoring. I reviewed medication adjustments.     - I wrote prescription for:    - baclofen (LIORESAL) 10 MG Tab; Take 1/2 to 1 tablet by mouth in the evening as needed for muscle spasm  Dispense: 15 Tab; Refill: 1, as a trial    - The patient can consider trial with Lidoderm patch or over-the-counter equivalent, if no contraindication    - I reviewed risks, side effects, and interactions of medications, including NSAIDs and over-the-counter medications.  I reviewed further symptomatic medications.    - I reviewed additional diagnostic options, including further/advanced imaging, electrodiagnostic testing, vascular studies, and further lab screen    - I reviewed additional therapeutic options, including further injection/interventional therapy and additional consultative input    - I reviewed psychosocial interventions    - I encourage the patient to stop or decrease cigarette use    - Return in 2 weeks or an as-needed basis      Please note that this dictation was created using voice recognition software. I have made every reasonable attempt to correct obvious errors but there may be errors of grammar and content that I may have overlooked prior to finalization of this note.

## 2018-11-06 ENCOUNTER — HOSPITAL ENCOUNTER (OUTPATIENT)
Dept: RADIOLOGY | Facility: MEDICAL CENTER | Age: 43
End: 2018-11-06
Attending: PHYSICAL MEDICINE & REHABILITATION
Payer: MEDICAID

## 2018-11-06 ENCOUNTER — HOSPITAL ENCOUNTER (OUTPATIENT)
Dept: LAB | Facility: MEDICAL CENTER | Age: 43
End: 2018-11-06
Attending: PHYSICAL MEDICINE & REHABILITATION
Payer: MEDICAID

## 2018-11-06 DIAGNOSIS — M51.36 DDD (DEGENERATIVE DISC DISEASE), LUMBAR: ICD-10-CM

## 2018-11-06 DIAGNOSIS — R10.9 ABDOMINAL PAIN, UNSPECIFIED ABDOMINAL LOCATION: ICD-10-CM

## 2018-11-06 DIAGNOSIS — M54.16 LUMBAR RADICULITIS: ICD-10-CM

## 2018-11-06 DIAGNOSIS — M54.50 LUMBOSACRAL PAIN: ICD-10-CM

## 2018-11-06 DIAGNOSIS — Z98.890 HISTORY OF LUMBAR SURGERY: ICD-10-CM

## 2018-11-06 DIAGNOSIS — M25.552 LEFT HIP PAIN: ICD-10-CM

## 2018-11-06 LAB
ALBUMIN SERPL BCP-MCNC: 4.4 G/DL (ref 3.2–4.9)
ALBUMIN/GLOB SERPL: 1.5 G/DL
ALP SERPL-CCNC: 91 U/L (ref 30–99)
ALT SERPL-CCNC: 72 U/L (ref 2–50)
ANION GAP SERPL CALC-SCNC: 10 MMOL/L (ref 0–11.9)
APPEARANCE UR: CLEAR
AST SERPL-CCNC: 65 U/L (ref 12–45)
BASOPHILS # BLD AUTO: 1.4 % (ref 0–1.8)
BASOPHILS # BLD: 0.1 K/UL (ref 0–0.12)
BILIRUB SERPL-MCNC: 0.6 MG/DL (ref 0.1–1.5)
BILIRUB UR QL STRIP.AUTO: NEGATIVE
BUN SERPL-MCNC: 12 MG/DL (ref 8–22)
CALCIUM SERPL-MCNC: 9.2 MG/DL (ref 8.5–10.5)
CHLORIDE SERPL-SCNC: 104 MMOL/L (ref 96–112)
CO2 SERPL-SCNC: 24 MMOL/L (ref 20–33)
COLOR UR: YELLOW
CREAT SERPL-MCNC: 1.05 MG/DL (ref 0.5–1.4)
EOSINOPHIL # BLD AUTO: 0.09 K/UL (ref 0–0.51)
EOSINOPHIL NFR BLD: 1.3 % (ref 0–6.9)
ERYTHROCYTE [DISTWIDTH] IN BLOOD BY AUTOMATED COUNT: 43.6 FL (ref 35.9–50)
GLOBULIN SER CALC-MCNC: 3 G/DL (ref 1.9–3.5)
GLUCOSE SERPL-MCNC: 123 MG/DL (ref 65–99)
GLUCOSE UR STRIP.AUTO-MCNC: NEGATIVE MG/DL
HCT VFR BLD AUTO: 48.8 % (ref 42–52)
HGB BLD-MCNC: 16.8 G/DL (ref 14–18)
IMM GRANULOCYTES # BLD AUTO: 0.08 K/UL (ref 0–0.11)
IMM GRANULOCYTES NFR BLD AUTO: 1.2 % (ref 0–0.9)
KETONES UR STRIP.AUTO-MCNC: NEGATIVE MG/DL
LEUKOCYTE ESTERASE UR QL STRIP.AUTO: NEGATIVE
LYMPHOCYTES # BLD AUTO: 2.3 K/UL (ref 1–4.8)
LYMPHOCYTES NFR BLD: 33.1 % (ref 22–41)
MCH RBC QN AUTO: 32.6 PG (ref 27–33)
MCHC RBC AUTO-ENTMCNC: 34.4 G/DL (ref 33.7–35.3)
MCV RBC AUTO: 94.8 FL (ref 81.4–97.8)
MICRO URNS: NORMAL
MONOCYTES # BLD AUTO: 0.79 K/UL (ref 0–0.85)
MONOCYTES NFR BLD AUTO: 11.4 % (ref 0–13.4)
NEUTROPHILS # BLD AUTO: 3.58 K/UL (ref 1.82–7.42)
NEUTROPHILS NFR BLD: 51.6 % (ref 44–72)
NITRITE UR QL STRIP.AUTO: NEGATIVE
NRBC # BLD AUTO: 0 K/UL
NRBC BLD-RTO: 0 /100 WBC
PH UR STRIP.AUTO: 6 [PH]
PLATELET # BLD AUTO: 203 K/UL (ref 164–446)
PMV BLD AUTO: 10.8 FL (ref 9–12.9)
POTASSIUM SERPL-SCNC: 3.5 MMOL/L (ref 3.6–5.5)
PROT SERPL-MCNC: 7.4 G/DL (ref 6–8.2)
PROT UR QL STRIP: NEGATIVE MG/DL
RBC # BLD AUTO: 5.15 M/UL (ref 4.7–6.1)
RBC UR QL AUTO: NEGATIVE
SODIUM SERPL-SCNC: 138 MMOL/L (ref 135–145)
SP GR UR STRIP.AUTO: 1
UROBILINOGEN UR STRIP.AUTO-MCNC: 0.2 MG/DL
WBC # BLD AUTO: 6.9 K/UL (ref 4.8–10.8)

## 2018-11-06 PROCEDURE — 85025 COMPLETE CBC W/AUTO DIFF WBC: CPT

## 2018-11-06 PROCEDURE — 73502 X-RAY EXAM HIP UNI 2-3 VIEWS: CPT | Mod: LT

## 2018-11-06 PROCEDURE — 72110 X-RAY EXAM L-2 SPINE 4/>VWS: CPT

## 2018-11-06 PROCEDURE — 36415 COLL VENOUS BLD VENIPUNCTURE: CPT

## 2018-11-06 PROCEDURE — 81003 URINALYSIS AUTO W/O SCOPE: CPT

## 2018-11-06 PROCEDURE — 80053 COMPREHEN METABOLIC PANEL: CPT

## 2018-11-28 ENCOUNTER — OFFICE VISIT (OUTPATIENT)
Dept: PHYSICAL MEDICINE AND REHAB | Facility: MEDICAL CENTER | Age: 43
End: 2018-11-28
Payer: MEDICAID

## 2018-11-28 VITALS
DIASTOLIC BLOOD PRESSURE: 88 MMHG | HEART RATE: 85 BPM | WEIGHT: 188.05 LBS | TEMPERATURE: 97.9 F | OXYGEN SATURATION: 97 % | SYSTOLIC BLOOD PRESSURE: 132 MMHG | HEIGHT: 71 IN | BODY MASS INDEX: 26.33 KG/M2

## 2018-11-28 DIAGNOSIS — M47.816 LUMBAR SPONDYLOSIS: ICD-10-CM

## 2018-11-28 DIAGNOSIS — M79.18 MYOFASCIAL PAIN: ICD-10-CM

## 2018-11-28 DIAGNOSIS — M54.50 LUMBOSACRAL PAIN: ICD-10-CM

## 2018-11-28 DIAGNOSIS — M54.16 LUMBAR RADICULITIS: ICD-10-CM

## 2018-11-28 DIAGNOSIS — M25.559 ARTHRALGIA OF HIP, UNSPECIFIED LATERALITY: ICD-10-CM

## 2018-11-28 PROCEDURE — 99212 OFFICE O/P EST SF 10 MIN: CPT | Mod: 25 | Performed by: PHYSICAL MEDICINE & REHABILITATION

## 2018-11-28 PROCEDURE — 20553 NJX 1/MLT TRIGGER POINTS 3/>: CPT | Performed by: PHYSICAL MEDICINE & REHABILITATION

## 2018-11-29 RX ORDER — METHYLPREDNISOLONE ACETATE 40 MG/ML
40 INJECTION, SUSPENSION INTRA-ARTICULAR; INTRALESIONAL; INTRAMUSCULAR; SOFT TISSUE ONCE
OUTPATIENT
Start: 2018-11-29 | End: 2018-11-30

## 2018-11-29 ASSESSMENT — ENCOUNTER SYMPTOMS
CLAUDICATION: 0
SPUTUM PRODUCTION: 0
ABDOMINAL PAIN: 1
EYE PAIN: 0
HEARTBURN: 1
MYALGIAS: 1
NAUSEA: 1
FEVER: 0
SENSORY CHANGE: 1
INSOMNIA: 1
SHORTNESS OF BREATH: 0
CHILLS: 0
TINGLING: 1
BACK PAIN: 1
ORTHOPNEA: 0
PHOTOPHOBIA: 0

## 2018-11-29 NOTE — PROGRESS NOTES
Subjective:      Tyron Milligan presents with Follow-Up        HPI Mr. Milligan returns to the office today for follow-up evaluation of spinal/joint/muscular skeletal pain.    The patient's history is significant for long history of low back pain, notes prior history of physical activities, bending/lifting tasks, tried conservative care and ultimately underwent lumbar spine surgery in 2008 with Dr. Andre, [facility].  The patient notes good response with the surgery although was left with some residual left lower limb neuropathic symptoms, relatively controlled until had a flare of pain beginning approximately 6-7 months ago without specific event or trauma.    Regarding today's visit:    The patient notes ongoing pain most prominent in the left lumbosacral region, with radiating pain in the left lower limb with neuropathic component, and the L5-S1 distribution, worse with activities, limiting his ability to function, including sitting, standing and walking tolerance.    The patient also notes left hip area pain.    The patient has had prior treatment with medications, including NSAIDs.  He has remotely been to physical therapy, retrial pending.  He has remotely tried injections by outside provider, noted transient side effect with epidural steroid injection. No acute changes with bowel/bladder noted.  No acute changes with strength noted.  He is making an effort with home exercise program as tolerated.  The ongoing pain limits his ability to function.  He is inquiring about additional nonsurgical treatment options.      MEDICAL RECORDS REVIEW/DATA REVIEW: Reviewed in epic.    I reviewed medications.  Reviewed medication list, includes ibuprofen, gabapentin, baclofen    I reviewed diagnostic studies:     I reviewed radiographs.     Reviewed lumbar spine x-rays 11/2018, I reviewed images and report, revealed degenerative disc disease, spondylosis, mild scoliosis, mild listhesis at L3-4 and L4-5 with mild instability  with flexion/extension    Reviewed left hip x-rays 11/2018, I reviewed images and report, unremarkable.    Reviewed MRI lumbar spine 10/2018 note multilevel degenerative changes, disc protrusions at L4-5 and L5-S1, foraminal stenosis at L4-5 and L5-S1, spondylosis, postoperative changes, discogenic edema at L5-S1, not reported on radiologist report    Reviewed abdominal ultrasound 10/2018, notes hepatic steatosis, prominent common bile duct.    I reviewed lab studies.  Reviewed labs 11/2018, including CMP, note blood sugar 138, nonfasting, mild hypokalemia, mildly elevated LFTs, CBC unremarkable, UA unremarkable.    I reviewed medical issues.     Followed by primary care provider.    Reviewed notes some [facility] from 2015.    The patient has evaluation in progress regarding abdominal pain, nausea, umbilical hernia, general surgery consulted, reviewed records most recently from 9/2018, note HIDA ordered, scheduling pending.     I reviewed family history: No neuromuscular disorders noted.    I reviewed social issues.  Landscaping      PAST MEDICAL HISTORY:   Past Medical History:   Diagnosis Date   • Back pain    • GERD (gastroesophageal reflux disease)    • Nausea    • Umbilical hernia     General surgery consulted, [facility]       PAST SURGICAL HISTORY:    Past Surgical History:   Procedure Laterality Date   • [facility]      [facility]       ALLERGIES:  Demerol    MEDICATIONS:    Outpatient Encounter Prescriptions as of 11/28/2018   Medication Sig Dispense Refill   • baclofen (LIORESAL) 10 MG Tab Take 1/2 to 1 tablet by mouth in the evening as needed for muscle spasm 15 Tab 1   • ibuprofen (MOTRIN) 600 MG Tab   0   • gabapentin (NEURONTIN) 600 MG tablet Take 1 Tab by mouth 3 times a day. 90 Tab 2   • famotidine (PEPCID) 40 MG Tab Take 1 Tab by mouth every evening. 30 Tab 2   • ondansetron (ZOFRAN ODT) 4 MG TABLET DISPERSIBLE Take 1 Tab by mouth every 8 hours as needed.  "10 Tab 2     No facility-administered encounter medications on file as of 11/28/2018.        SOCIAL HISTORY:    Social History     Social History   • Marital status: Single     Spouse name: N/A   • Number of children: N/A   • Years of education: N/A     Social History Main Topics   • Smoking status: Current Every Day Smoker     Packs/day: 0.00     Years: 10.00   • Smokeless tobacco: Never Used      Comment: 09/20/18 - 1 cig/day if that   • Alcohol use Yes      Comment: occ   • Drug use: No   • Sexual activity: Not on file     Other Topics Concern   •  Service No   • Blood Transfusions No   • Caffeine Concern Yes   • Occupational Exposure No   • Hobby Hazards No   • Sleep Concern Yes   • Stress Concern Yes   • Weight Concern No   • Special Diet No   • Back Care Yes   • Exercise No   • Bike Helmet Yes   • Seat Belt Yes   • Self-Exams No     Social History Narrative   • No narrative on file       Review of Systems   Constitutional: Negative for chills and fever.   HENT: Negative for ear pain and tinnitus.    Eyes: Negative for photophobia and pain.   Respiratory: Negative for sputum production and shortness of breath.    Cardiovascular: Negative for orthopnea and claudication.   Gastrointestinal: Positive for abdominal pain, heartburn and nausea.   Genitourinary: Negative for frequency and hematuria.   Musculoskeletal: Positive for back pain, joint pain and myalgias.   Skin: Negative.    Neurological: Positive for tingling and sensory change.   Psychiatric/Behavioral: The patient has insomnia.    Reviewed, no changes noted     Objective:     /88 (BP Location: Left arm, Patient Position: Sitting, BP Cuff Size: Adult)   Pulse 85   Temp 36.6 °C (97.9 °F) (Temporal)   Ht 1.803 m (5' 11\")   Wt 85.3 kg (188 lb 0.8 oz)   SpO2 97%   BMI 26.23 kg/m²      Physical Exam   Constitutional: oriented to person, place, and time, appears well-developed and well-nourished.   HEENT: Normocephalic atraumatic, neck " supple, no JVD noted, no masses noted, no meningeal signs noted  Lymphadenopathy: no cervical, supraclavicular, or inguinal lymphadenopathy noted  Cardiovascular: Intact distal pulses, including at ankles, no limb swelling noted  Pulmonary: No tachypnea noted, no accessory muscle use noted, no dyspnea noted  Abdominal: Soft, mild tender, exhibits no distension, no peritoneal signs, no HSM  Musculoskeletal:   Hip: Only minimal tenderness, only minimal pain with range of motion testing, primarily on left  Lumbar back: exhibits  decreased range of motion, tenderness and pain.  straight leg testing produces posterior pelvic/thigh pain on the left, trigger points noted, primarily left lumbosacral region  Neurological: oriented to person, place, and time. Cranial nerves grossly intact, normal strength. Sensation intact distally. Reflexes 1-2+ in upper and lower limbs, Gait mildly antalgic, reciprocal  Skin: Skin is intact. no rashes or lesions noted  Psychiatric: normal mood and affect. speech is normal and behavior is normal. Judgment and thought content normal.       Procedure note: Written/informed consent was obtained, risks benefits alternatives discussed, and all questions were answered.  The patient was placed prone in the exam table and 3 trigger points were identified in the left lumbosacral region.  The areas were marked, then sterilely prepared.  Following local skin anesthesia using a 25-gauge needle, trigger point injections were performed with injection of 2 cc of a mixture of 1 cc of Depo-Medrol 40 mg/cc NDC [id number] and 5 cc of 1% lidocaine at each site.  The patient tolerated the procedure well.  There were no complications.          Assessment/Plan:       ASSESSMENT:    1.  Persistent lumbosacral pain, myofascial pain, lumbosacral radiculitis, lumbar disc protrusion, degenerative disc disease, listhesis with mild instability, foraminal stenosis, facet arthropathy, spondylosis, mild scoliosis,  history of lumbar spine surgery approximately 2008    - I reviewed postprocedure  - Reviewed additional/further injection/interventional therapy depending on response with trigger point injections, physical therapy treatment trial, home exercise program    2. Left hip pain, sprain strain,, sprain strain, relatively controlled    3.  Comorbid medical issues, including umbilical hernia, abdominal pain, elevated LFTs, GERD, nausea, with care per primary care provider, and consultants, including general surgery    - Note pending HIDA scan      DISCUSSION/PLAN:    - I discussed management options. I reviewed symptomatic care    - Initiate physical therapy, previously ordered, scheduled.  I reviewed home exercise program, with medical/spinal precautions, with general surgery activity/restrictions    - The patient can consider complementary trials with acupuncture, superficial massage therapy, or TENS unit    - I reviewed medication monitoring.  For now, continue current medications. I reviewed medication adjustments, has active prescription.     - The patient can consider trial with Lidoderm patch or over-the-counter equivalent, if no contraindication    - I reviewed risks, side effects, and interactions of medications, including NSAIDs and over-the-counter medications.  I reviewed further symptomatic medications.    - I reviewed additional diagnostic options, including further/advanced imaging, electrodiagnostic testing, vascular studies, and further lab screen    - I reviewed additional therapeutic options, including further injection/interventional therapy, offered epidural steroid injection, and additional consultative input    - I reviewed psychosocial interventions    - I encourage the patient to stop or decrease cigarette use    - Return after the physical therapy treatment trial or sooner if needed      Please note that this dictation was created using voice recognition software. I have made every reasonable  attempt to correct obvious errors but there may be errors of grammar and content that I may have overlooked prior to finalization of this note.

## 2018-12-14 ENCOUNTER — OFFICE VISIT (OUTPATIENT)
Dept: PHYSICAL MEDICINE AND REHAB | Facility: MEDICAL CENTER | Age: 43
End: 2018-12-14
Payer: MEDICAID

## 2018-12-14 VITALS
WEIGHT: 187.17 LBS | HEART RATE: 96 BPM | TEMPERATURE: 97.3 F | DIASTOLIC BLOOD PRESSURE: 78 MMHG | BODY MASS INDEX: 26.2 KG/M2 | SYSTOLIC BLOOD PRESSURE: 122 MMHG | HEIGHT: 71 IN | OXYGEN SATURATION: 94 %

## 2018-12-14 DIAGNOSIS — M51.36 DDD (DEGENERATIVE DISC DISEASE), LUMBAR: ICD-10-CM

## 2018-12-14 DIAGNOSIS — M62.838 MUSCLE SPASM: ICD-10-CM

## 2018-12-14 DIAGNOSIS — M54.50 LUMBOSACRAL PAIN: ICD-10-CM

## 2018-12-14 DIAGNOSIS — M54.16 LUMBAR RADICULITIS: ICD-10-CM

## 2018-12-14 DIAGNOSIS — M25.559 ARTHRALGIA OF HIP, UNSPECIFIED LATERALITY: ICD-10-CM

## 2018-12-14 DIAGNOSIS — Z98.890 HISTORY OF LUMBAR SURGERY: ICD-10-CM

## 2018-12-14 DIAGNOSIS — M79.18 MYOFASCIAL PAIN: ICD-10-CM

## 2018-12-14 PROCEDURE — 99213 OFFICE O/P EST LOW 20 MIN: CPT | Performed by: PHYSICAL MEDICINE & REHABILITATION

## 2018-12-14 RX ORDER — TIZANIDINE 4 MG/1
4 TABLET ORAL 3 TIMES DAILY
Qty: 30 TAB | Refills: 1 | Status: SHIPPED | OUTPATIENT
Start: 2018-12-14 | End: 2020-06-02

## 2018-12-14 RX ORDER — ACETAMINOPHEN 500 MG
500-1000 TABLET ORAL EVERY 6 HOURS PRN
COMMUNITY
End: 2020-06-02

## 2018-12-14 ASSESSMENT — ENCOUNTER SYMPTOMS
PHOTOPHOBIA: 0
INSOMNIA: 1
SPUTUM PRODUCTION: 0
SENSORY CHANGE: 1
TINGLING: 1
MYALGIAS: 1
CHILLS: 0
SHORTNESS OF BREATH: 0
FEVER: 0
EYE PAIN: 0
HEARTBURN: 1
CLAUDICATION: 0
ORTHOPNEA: 0
ABDOMINAL PAIN: 1
NAUSEA: 1
BACK PAIN: 1

## 2018-12-14 NOTE — PROGRESS NOTES
Subjective:      Tyron Milligan presents with Follow-Up        HPI Mr. Milligan returns to the office today for follow-up evaluation of spinal/joint/muscular skeletal pain.    The patient notes long history of low back pain, notes prior history of physical activities, bending/lifting tasks, tried conservative care and ultimately [facility]  The patient notes good response with the surgery although was left with some residual left lower limb neuropathic symptoms, relatively controlled until had flare of pain beginning approximately 7 months ago without specific event or trauma.    Regarding today's visit:    The patient notes no benefit with the lumbosacral trigger point injections performed at the last visit, denies complications.    Unfortunately, the patient notes ongoing pain most prominent in the left lumbosacral region, with radiating pain in the left lower limb with neuropathic component, and the L5-S1 distribution, worse with activities, limiting his ability to function, including sitting, standing and walking tolerance.    The patient also notes intermittent left hip area pain, primarily activity associated.    The patient has had prior treatment with medications, including NSAIDs.  He has previously been to physical therapy, without benefit.  He has remotely tried injections by outside provider, noted transient side effect with epidural steroid injection. No acute changes with bowel/bladder noted. No acute changes with strength noted.  He is making an effort with home exercise program as tolerated.  The ongoing pain limits his ability to function.  The patient is inquiring about surgical referral, as he received benefit from prior lumbar spine surgery.      MEDICAL RECORDS REVIEW/DATA REVIEW: Reviewed in epic.    I reviewed medications.  Reviewed medication list, includes ibuprofen, gabapentin.  Did not tolerate prior trials with: Baclofen, Flexeril    I  reviewed diagnostic studies:     I reviewed radiographs.     Reviewed MRI lumbar spine 10/2018 note multilevel degenerative changes, disc protrusions at L4-5 and L5-S1, foraminal stenosis at L4-5 and L5-S1, spondylosis, postoperative changes, discogenic edema at L5-S1, not reported on radiologist report    Reviewed lumbar spine x-rays 11/2018 revealed degenerative disc disease, spondylosis, mild scoliosis, mild listhesis at L3-4 and L4-5 with mild instability with flexion/extension    Reviewed left hip x-rays 11/2018 unremarkable.    Reviewed abdominal ultrasound 10/2018, notes hepatic steatosis, prominent common bile duct.    I reviewed lab studies.  Reviewed labs 11/2018, including CMP, note blood sugar 138, nonfasting, mild hypokalemia, mildly elevated LFTs, CBC unremarkable, UA unremarkable.    I reviewed medical issues.      Followed by primary care provider per    The patient has evaluation in progress regarding abdominal pain, nausea, umbilical hernia, general surgery consulted, reviewed records most recently from 9/2018, note HIDA ordered, scheduling pending.     Reviewed notes some [facility] from 2015.    I reviewed family history: No neuromuscular disorders noted.    I reviewed social issues.  Landscaping      PAST MEDICAL HISTORY:   Past Medical History:   Diagnosis Date   • Back pain    • GERD (gastroesophageal reflux disease)    • Nausea    • Umbilical hernia     General surgery consulted, [facility]       PAST SURGICAL HISTORY:    Past Surgical History:   Procedure Laterality Date   • [facility]      [facility]       ALLERGIES:  Demerol    MEDICATIONS:    Outpatient Encounter Prescriptions as of 12/14/2018   Medication Sig Dispense Refill   • acetaminophen (TYLENOL) 500 MG Tab Take 500-1,000 mg by mouth every 6 hours as needed.     • tizanidine (ZANAFLEX) 4 MG Tab Take 1 Tab by mouth 3 times a day. as needed for muscle spasm 30 Tab 1   • gabapentin (NEURONTIN)  600 MG tablet Take 1 Tab by mouth 3 times a day. 90 Tab 2   • famotidine (PEPCID) 40 MG Tab Take 1 Tab by mouth every evening. 30 Tab 2   • ibuprofen (MOTRIN) 600 MG Tab   0   • [DISCONTINUED] baclofen (LIORESAL) 10 MG Tab Take 1/2 to 1 tablet by mouth in the evening as needed for muscle spasm (Patient not taking: Reported on 12/14/2018) 15 Tab 1   • ondansetron (ZOFRAN ODT) 4 MG TABLET DISPERSIBLE Take 1 Tab by mouth every 8 hours as needed. (Patient not taking: Reported on 12/14/2018) 10 Tab 2     No facility-administered encounter medications on file as of 12/14/2018.        SOCIAL HISTORY:    Social History     Social History   • Marital status: Single     Spouse name: N/A   • Number of children: N/A   • Years of education: N/A     Social History Main Topics   • Smoking status: Current Every Day Smoker     Packs/day: 0.00     Years: 10.00   • Smokeless tobacco: Never Used      Comment: 09/20/18 - 1 cig/day if that   • Alcohol use Yes      Comment: occ   • Drug use: No   • Sexual activity: Not on file     Other Topics Concern   •  Service No   • Blood Transfusions No   • Caffeine Concern Yes   • Occupational Exposure No   • Hobby Hazards No   • Sleep Concern Yes   • Stress Concern Yes   • Weight Concern No   • Special Diet No   • Back Care Yes   • Exercise No   • Bike Helmet Yes   • Seat Belt Yes   • Self-Exams No     Social History Narrative   • No narrative on file       Review of Systems   Constitutional: Negative for chills and fever.   HENT: Negative for ear pain and tinnitus.    Eyes: Negative for photophobia and pain.   Respiratory: Negative for sputum production and shortness of breath.    Cardiovascular: Negative for orthopnea and claudication.   Gastrointestinal: Positive for abdominal pain, heartburn and nausea.   Genitourinary: Negative for frequency and hematuria.   Musculoskeletal: Positive for back pain, joint pain and myalgias.   Skin: Negative.    Neurological: Positive for tingling and  "sensory change.   Psychiatric/Behavioral: The patient has insomnia.    Reviewed, no changes noted     Objective:     /78 (BP Location: Left arm, Patient Position: Sitting, BP Cuff Size: Adult)   Pulse 96   Temp 36.3 °C (97.3 °F) (Temporal)   Ht 1.803 m (5' 11\")   Wt 84.9 kg (187 lb 2.7 oz)   SpO2 94%   BMI 26.11 kg/m²      Physical Exam   Constitutional: oriented to person, place, and time, appears well-developed and well-nourished.   HEENT: Normocephalic atraumatic, neck supple, no JVD noted, no masses noted, no meningeal signs noted  Lymphadenopathy: no cervical, supraclavicular, or inguinal lymphadenopathy noted  Cardiovascular: Intact distal pulses, including at wrists and ankles, no limb swelling noted  Pulmonary: No tachypnea noted, no accessory muscle use noted, no dyspnea noted  Abdominal: Soft, nontender, exhibits no distension, no peritoneal signs, no HSM  Musculoskeletal:   Right hip: exhibits only mild tenderness. Minimal pain with range of motion testing  Left hip: exhibits mild tenderness. Mild pain with range of motion testing  Lumbar back: exhibits  decreased range of motion,  tenderness and  pain. straight leg testing produces posterior pelvic and thigh pain on the left  Neurological: oriented to person, place, and time. Cranial nerves grossly intact, normal strength. Sensation intact distally. Reflexes 1-2+ in lower limbs, gait antalgic, reciprocal  Skin: Skin is intact. no rashes or lesions noted  Psychiatric: normal mood and affect. speech is normal and behavior is normal. Judgment and thought content normal.         Assessment/Plan:       ASSESSMENT:    1.  Persistent lumbosacral pain, myofascial pain, lumbosacral radiculitis, lumbar disc protrusion, degenerative disc disease, listhesis with mild instability, foraminal stenosis, facet arthropathy, spondylosis, mild scoliosis, history of lumbar spine surgery approximately 2008    - Per patient request, submitted spine surgery " consultation for evaluation of surgical options, urgently submitted    2. Left hip pain, sprain strain,, sprain strain, relatively controlled    3.  Comorbid medical issues, including umbilical hernia, abdominal pain, elevated LFTs, GERD, nausea, with care per primary care provider, and consultants, including general surgery    - Note pending HIDA scan      DISCUSSION/PLAN:    - I discussed management options. I reviewed symptomatic care    - The patient can physical therapy, previously ordered.  I reviewed home exercise program, with medical/spinal precautions, with general surgery activity/restrictions    - The patient can consider complementary trials with acupuncture, superficial massage therapy, or TENS unit    - I reviewed medication monitoring.  I reviewed medication adjustments.     - I added tizanidine as a trial.  - The patient can consider trial with Lidoderm patch or over-the-counter equivalent, if no contraindication    - I reviewed risks, side effects, and interactions of medications, including over-the-counter medications.  I reviewed further symptomatic medications.    - I reviewed additional diagnostic options, including further/advanced imaging, electrodiagnostic testing, vascular studies, and further lab screen    - I reviewed additional therapeutic options, including injection/interventional therapy, offered epidural steroid injection, and additional consultative input    - I reviewed psychosocial interventions    - I encourage the patient to stop or decrease cigarette use    - Return after the spine surgery consultation or an as-needed basis      Please note that this dictation was created using voice recognition software. I have made every reasonable attempt to correct obvious errors but there may be errors of grammar and content that I may have overlooked prior to finalization of this note.

## 2019-09-26 ENCOUNTER — NON-PROVIDER VISIT (OUTPATIENT)
Dept: URGENT CARE | Facility: PHYSICIAN GROUP | Age: 44
End: 2019-09-26

## 2019-09-26 DIAGNOSIS — Z02.1 PRE-EMPLOYMENT DRUG SCREENING: ICD-10-CM

## 2019-09-26 LAB
AMP AMPHETAMINE: NORMAL
COC COCAINE: NORMAL
INT CON NEG: NORMAL
INT CON POS: NORMAL
MET METHAMPHETAMINES: NORMAL
OPI OPIATES: NORMAL
PCP PHENCYCLIDINE: NORMAL
POC DRUG COMMENT 753798-OCCUPATIONAL HEALTH: NEGATIVE
THC: NORMAL

## 2019-09-26 PROCEDURE — 80305 DRUG TEST PRSMV DIR OPT OBS: CPT | Performed by: PHYSICIAN ASSISTANT

## 2020-06-02 ENCOUNTER — HOSPITAL ENCOUNTER (OUTPATIENT)
Dept: LAB | Facility: MEDICAL CENTER | Age: 45
End: 2020-06-02
Attending: FAMILY MEDICINE
Payer: MEDICAID

## 2020-06-02 ENCOUNTER — OFFICE VISIT (OUTPATIENT)
Dept: URGENT CARE | Facility: PHYSICIAN GROUP | Age: 45
End: 2020-06-02
Payer: MEDICAID

## 2020-06-02 VITALS
DIASTOLIC BLOOD PRESSURE: 100 MMHG | HEIGHT: 71 IN | HEART RATE: 110 BPM | OXYGEN SATURATION: 93 % | SYSTOLIC BLOOD PRESSURE: 140 MMHG | TEMPERATURE: 99.1 F | RESPIRATION RATE: 16 BRPM | WEIGHT: 191 LBS | BODY MASS INDEX: 26.74 KG/M2

## 2020-06-02 DIAGNOSIS — R79.89 ELEVATED LFTS: ICD-10-CM

## 2020-06-02 DIAGNOSIS — R53.83 FATIGUE, UNSPECIFIED TYPE: ICD-10-CM

## 2020-06-02 DIAGNOSIS — R42 DIZZINESS: ICD-10-CM

## 2020-06-02 DIAGNOSIS — R03.0 ELEVATED BLOOD PRESSURE READING: ICD-10-CM

## 2020-06-02 DIAGNOSIS — R00.0 TACHYCARDIA: ICD-10-CM

## 2020-06-02 LAB
ALBUMIN SERPL BCP-MCNC: 4.6 G/DL (ref 3.2–4.9)
ALBUMIN/GLOB SERPL: 1.5 G/DL
ALP SERPL-CCNC: 94 U/L (ref 30–99)
ALT SERPL-CCNC: 42 U/L (ref 2–50)
ANION GAP SERPL CALC-SCNC: 14 MMOL/L (ref 7–16)
AST SERPL-CCNC: 36 U/L (ref 12–45)
BASOPHILS # BLD AUTO: 1.4 % (ref 0–1.8)
BASOPHILS # BLD: 0.11 K/UL (ref 0–0.12)
BILIRUB SERPL-MCNC: 0.8 MG/DL (ref 0.1–1.5)
BUN SERPL-MCNC: 8 MG/DL (ref 8–22)
CALCIUM SERPL-MCNC: 9.6 MG/DL (ref 8.5–10.5)
CHLORIDE SERPL-SCNC: 99 MMOL/L (ref 96–112)
CHOLEST SERPL-MCNC: 201 MG/DL (ref 100–199)
CO2 SERPL-SCNC: 25 MMOL/L (ref 20–33)
CREAT SERPL-MCNC: 1.03 MG/DL (ref 0.5–1.4)
EOSINOPHIL # BLD AUTO: 0.04 K/UL (ref 0–0.51)
EOSINOPHIL NFR BLD: 0.5 % (ref 0–6.9)
ERYTHROCYTE [DISTWIDTH] IN BLOOD BY AUTOMATED COUNT: 43.5 FL (ref 35.9–50)
EST. AVERAGE GLUCOSE BLD GHB EST-MCNC: 100 MG/DL
GLOBULIN SER CALC-MCNC: 3.1 G/DL (ref 1.9–3.5)
GLUCOSE SERPL-MCNC: 104 MG/DL (ref 65–99)
HAV IGM SERPL QL IA: NORMAL
HBA1C MFR BLD: 5.1 % (ref 0–5.6)
HBV CORE IGM SER QL: NORMAL
HBV SURFACE AG SER QL: NORMAL
HCT VFR BLD AUTO: 55.8 % (ref 42–52)
HCV AB SER QL: NORMAL
HDLC SERPL-MCNC: 77 MG/DL
HGB BLD-MCNC: 18.9 G/DL (ref 14–18)
IMM GRANULOCYTES # BLD AUTO: 0.1 K/UL (ref 0–0.11)
IMM GRANULOCYTES NFR BLD AUTO: 1.3 % (ref 0–0.9)
LDLC SERPL CALC-MCNC: 90 MG/DL
LYMPHOCYTES # BLD AUTO: 2.05 K/UL (ref 1–4.8)
LYMPHOCYTES NFR BLD: 26.2 % (ref 22–41)
MCH RBC QN AUTO: 31.5 PG (ref 27–33)
MCHC RBC AUTO-ENTMCNC: 33.9 G/DL (ref 33.7–35.3)
MCV RBC AUTO: 93 FL (ref 81.4–97.8)
MONOCYTES # BLD AUTO: 0.7 K/UL (ref 0–0.85)
MONOCYTES NFR BLD AUTO: 8.9 % (ref 0–13.4)
NEUTROPHILS # BLD AUTO: 4.83 K/UL (ref 1.82–7.42)
NEUTROPHILS NFR BLD: 61.7 % (ref 44–72)
NRBC # BLD AUTO: 0 K/UL
NRBC BLD-RTO: 0 /100 WBC
PLATELET # BLD AUTO: 232 K/UL (ref 164–446)
PMV BLD AUTO: 10.6 FL (ref 9–12.9)
POTASSIUM SERPL-SCNC: 4.2 MMOL/L (ref 3.6–5.5)
PROT SERPL-MCNC: 7.7 G/DL (ref 6–8.2)
RBC # BLD AUTO: 6 M/UL (ref 4.7–6.1)
SODIUM SERPL-SCNC: 138 MMOL/L (ref 135–145)
TRIGL SERPL-MCNC: 169 MG/DL (ref 0–149)
TSH SERPL DL<=0.005 MIU/L-ACNC: 1.72 UIU/ML (ref 0.38–5.33)
WBC # BLD AUTO: 7.8 K/UL (ref 4.8–10.8)

## 2020-06-02 PROCEDURE — 80061 LIPID PANEL: CPT

## 2020-06-02 PROCEDURE — 99214 OFFICE O/P EST MOD 30 MIN: CPT | Performed by: FAMILY MEDICINE

## 2020-06-02 PROCEDURE — 85025 COMPLETE CBC W/AUTO DIFF WBC: CPT

## 2020-06-02 PROCEDURE — 36415 COLL VENOUS BLD VENIPUNCTURE: CPT

## 2020-06-02 PROCEDURE — 84443 ASSAY THYROID STIM HORMONE: CPT

## 2020-06-02 PROCEDURE — 83036 HEMOGLOBIN GLYCOSYLATED A1C: CPT

## 2020-06-02 PROCEDURE — 80053 COMPREHEN METABOLIC PANEL: CPT

## 2020-06-02 PROCEDURE — 80074 ACUTE HEPATITIS PANEL: CPT

## 2020-06-02 SDOH — HEALTH STABILITY: MENTAL HEALTH: HOW OFTEN DO YOU HAVE A DRINK CONTAINING ALCOHOL?: 4 OR MORE TIMES A WEEK

## 2020-06-02 ASSESSMENT — FIBROSIS 4 INDEX: FIB4 SCORE: 1.62

## 2020-06-02 NOTE — PROGRESS NOTES
Chief Complaint:    Chief Complaint   Patient presents with   • Hypertension     has been running high x 3 days, when it first started thought he had heat stroke but not getting any better   • Dizziness   • Fatigue       History of Present Illness:    This is a new problem. 3 days ago, patient wasn't feeling well. Felt some fatigue and dizziness. Sometimes he will feel like this and gets better with rest. However, as days went on, symptoms did not improve. He went to mother's house yesterday, checked his blood sugar and it was OK. Wife got a BP machine last night and he started checking his BP this AM. He took 3-4 readings and they were consistently elevated. SBPs in high 140s-150s and DBPs 90s-100s. Patient does not know family medical history well and is not sure if there is family h/o HTN. Patient had elevated LFTs (11/6/18) he feels was due to drinking. He reports he still drinks a lot of alcohol.      Review of Systems:    Constitutional: See HPI. Negative for fever, chills, and diaphoresis.   Eyes: Negative for change in vision, photophobia, pain, redness, and discharge.  ENT: Negative for ear pain, ear discharge, hearing loss, tinnitus, nasal congestion, nosebleeds, and sore throat.    Respiratory: Negative for cough, hemoptysis, sputum production, shortness of breath, wheezing, and stridor.    Cardiovascular: See HPI.  Gastrointestinal: Negative for abdominal pain, nausea, vomiting, diarrhea, constipation, blood in stool, and melena.   Genitourinary: Negative for dysuria, urinary urgency, urinary frequency, hematuria, and flank pain.   Musculoskeletal: Negative for myalgias, joint pain, neck pain, and back pain.   Skin: Negative for rash and itching.   Neurological: See HPI.  Endo: Negative for polydipsia.   Heme: Does not bruise/bleed easily.   Psychiatric/Behavioral: Negative for depression, suicidal ideas, hallucinations, and memory loss. The patient is not nervous/anxious and does not have insomnia.         Past Medical History:    Past Medical History:   Diagnosis Date   • Back pain    • GERD (gastroesophageal reflux disease)    • Nausea    • Umbilical hernia     General surgery consulted, [facility]     Past Surgical History:    Past Surgical History:   Procedure Laterality Date   • LAMINOTOMY      approx 2008, Dr. Andre, [facility]     Social History:    Social History     Socioeconomic History   • Marital status: Single     Spouse name: Not on file   • Number of children: Not on file   • Years of education: Not on file   • Highest education level: Not on file   Occupational History   • Not on file   Social Needs   • Financial resource strain: Not on file   • Food insecurity     Worry: Not on file     Inability: Not on file   • Transportation needs     Medical: Not on file     Non-medical: Not on file   Tobacco Use   • Smoking status: Current Every Day Smoker     Packs/day: 0.00     Years: 10.00     Pack years: 0.00   • Smokeless tobacco: Never Used   • Tobacco comment: 09/20/18 - 1 cig/day if that   Substance and Sexual Activity   • Alcohol use: Yes     Frequency: 4 or more times a week     Comment: occ   • Drug use: No   • Sexual activity: Not on file   Lifestyle   • Physical activity     Days per week: Not on file     Minutes per session: Not on file   • Stress: Not on file   Relationships   • Social connections     Talks on phone: Not on file     Gets together: Not on file     Attends Orthodox service: Not on file     Active member of club or organization: Not on file     Attends meetings of clubs or organizations: Not on file     Relationship status: Not on file   • Intimate partner violence     Fear of current or ex partner: Not on file     Emotionally abused: Not on file     Physically abused: Not on file     Forced sexual activity: Not on file   Other Topics Concern   •  Service No   • Blood Transfusions No   • Caffeine Concern Yes   • Occupational Exposure No   • Hobby Hazards No   • Sleep  "Concern Yes   • Stress Concern Yes   • Weight Concern No   • Special Diet No   • Back Care Yes   • Exercise No   • Bike Helmet Yes   • Seat Belt Yes   • Self-Exams No   Social History Narrative   • Not on file     Family History:    Family History   Problem Relation Age of Onset   • Cancer Mother         throat   • Heart Disease Father      Medications:    No current outpatient medications on file prior to visit.     No current facility-administered medications on file prior to visit.      Allergies:    Allergies   Allergen Reactions   • Demerol Rash     phlebitis       Vitals:    Vitals:    06/02/20 0941   BP: 140/100   Pulse: (!) 110   Resp: 16   Temp: 37.3 °C (99.1 °F)   TempSrc: Temporal   SpO2: 93%   Weight: 86.6 kg (191 lb)   Height: 1.803 m (5' 11\")       Physical Exam:    Constitutional: Vital signs reviewed. Appears well-developed and well-nourished. No acute distress.   Eyes: Sclera white, conjunctivae clear. PERRLA.  ENT: External ears normal. External auditory canals normal without discharge. TMs translucent and non-bulging. Hearing normal. Nasal mucosa pink. Lips/teeth are normal. Oral mucosa pink and moist. Posterior pharynx: WNL.  Neck: Neck supple.   Cardiovascular: Regular rate and rhythm. No murmur.  Pulmonary/Chest: Respirations non-labored. Clear to auscultation bilaterally.  Abdomen: Bowel sounds are normal active. Soft, non-distended, and non-tender to palpation.  Musculoskeletal: Normal gait. Normal range of motion. No tenderness to palpation. No muscular atrophy or weakness.  Neurological: Alert and oriented to person, place, and time. CN 2-12 intact. Muscle tone normal. Coordination normal. Normal cerebellar exam.  Skin: No rashes or lesions. Warm, dry, normal turgor.  Psychiatric: Normal mood and affect. Behavior is normal. Judgment and thought content normal.       Diagnostics:    EKG: Sinus rhythm 96. Normal ECG.    EKG: Sinus rhythm 94. ST elev, probable early repol pattern.    EKGs " reviewed with him and copies to him.      Assessment / Plan:    1. Tachycardia (on initial vitals, went down to 90s on EKGs)  - EKG  - Comp Metabolic Panel; Future  - CBC WITH DIFFERENTIAL; Future  - Lipid Profile; Future  - TSH WITH REFLEX TO FT4; Future  - HEPATITIS PANEL ACUTE(4 COMPONENTS); Future  - HEMOGLOBIN A1C; Future    2. Elevated blood pressure reading  - EKG  - Comp Metabolic Panel; Future  - CBC WITH DIFFERENTIAL; Future  - Lipid Profile; Future  - TSH WITH REFLEX TO FT4; Future  - HEPATITIS PANEL ACUTE(4 COMPONENTS); Future  - HEMOGLOBIN A1C; Future    3. Elevated LFTs  - Comp Metabolic Panel; Future  - CBC WITH DIFFERENTIAL; Future  - Lipid Profile; Future  - TSH WITH REFLEX TO FT4; Future  - HEPATITIS PANEL ACUTE(4 COMPONENTS); Future  - HEMOGLOBIN A1C; Future    4. Fatigue, unspecified type  - Comp Metabolic Panel; Future  - CBC WITH DIFFERENTIAL; Future  - Lipid Profile; Future  - TSH WITH REFLEX TO FT4; Future  - HEPATITIS PANEL ACUTE(4 COMPONENTS); Future  - HEMOGLOBIN A1C; Future    5. Dizziness  - Comp Metabolic Panel; Future  - CBC WITH DIFFERENTIAL; Future  - Lipid Profile; Future  - TSH WITH REFLEX TO FT4; Future  - HEPATITIS PANEL ACUTE(4 COMPONENTS); Future  - HEMOGLOBIN A1C; Future      Discussed with him DDX, management options, and risks, benefits, and alternatives to treatment plan agreed upon.    Exam and EKGs are benign.    Recommended check blood tests. He is agreeable. He is fasting today and labs were drawn.    He will continue checking his BP at home.    We discussed that if labs are non-revealing for cause of symptoms and BPs are still consistently high at home, I can Rx him BP medication to [facility] and he should follow-up with PCP.

## 2020-09-25 ENCOUNTER — TELEPHONE (OUTPATIENT)
Dept: CARDIOLOGY | Facility: MEDICAL CENTER | Age: 45
End: 2020-09-25

## 2020-09-25 NOTE — TELEPHONE ENCOUNTER
Attempted to call patient to see if he has followed with a cardiologist in the past to get records prior to new patient appt with [facility]. Unable to reach patient, patient has calling restrictions so unable to complete call.

## 2022-01-20 ENCOUNTER — NON-PROVIDER VISIT (OUTPATIENT)
Dept: URGENT CARE | Facility: PHYSICIAN GROUP | Age: 47
End: 2022-01-20

## 2022-01-20 DIAGNOSIS — Z02.1 PRE-EMPLOYMENT DRUG SCREENING: ICD-10-CM

## 2022-01-20 PROCEDURE — 80305 DRUG TEST PRSMV DIR OPT OBS: CPT | Performed by: NURSE PRACTITIONER

## 2023-04-04 ENCOUNTER — NON-PROVIDER VISIT (OUTPATIENT)
Dept: URGENT CARE | Facility: PHYSICIAN GROUP | Age: 48
End: 2023-04-04

## 2023-04-04 DIAGNOSIS — Z02.1 PRE-EMPLOYMENT DRUG SCREENING: ICD-10-CM

## 2023-04-04 LAB
AMP AMPHETAMINE: NORMAL
COC COCAINE: NORMAL
INT CON NEG: NORMAL
INT CON POS: NORMAL
MET METHAMPHETAMINES: NORMAL
OPI OPIATES: NORMAL
PCP PHENCYCLIDINE: NORMAL
POC DRUG COMMENT 753798-OCCUPATIONAL HEALTH: NORMAL
THC: NORMAL

## 2023-04-04 PROCEDURE — 80305 DRUG TEST PRSMV DIR OPT OBS: CPT | Performed by: FAMILY MEDICINE

## 2024-08-06 ENCOUNTER — APPOINTMENT (OUTPATIENT)
Dept: RADIOLOGY | Facility: IMAGING CENTER | Age: 49
End: 2024-08-06
Attending: PHYSICIAN ASSISTANT
Payer: COMMERCIAL

## 2024-08-06 ENCOUNTER — OFFICE VISIT (OUTPATIENT)
Dept: URGENT CARE | Facility: PHYSICIAN GROUP | Age: 49
End: 2024-08-06
Payer: COMMERCIAL

## 2024-08-06 VITALS
OXYGEN SATURATION: 95 % | SYSTOLIC BLOOD PRESSURE: 124 MMHG | RESPIRATION RATE: 16 BRPM | BODY MASS INDEX: 23.62 KG/M2 | DIASTOLIC BLOOD PRESSURE: 82 MMHG | TEMPERATURE: 98.2 F | HEIGHT: 70 IN | WEIGHT: 165 LBS | HEART RATE: 102 BPM

## 2024-08-06 DIAGNOSIS — M47.9 SPONDYLOSIS: ICD-10-CM

## 2024-08-06 DIAGNOSIS — M54.6 CHRONIC BILATERAL THORACIC BACK PAIN: ICD-10-CM

## 2024-08-06 DIAGNOSIS — M54.2 CHRONIC NECK PAIN: ICD-10-CM

## 2024-08-06 DIAGNOSIS — G89.29 CHRONIC BILATERAL THORACIC BACK PAIN: ICD-10-CM

## 2024-08-06 DIAGNOSIS — G89.29 CHRONIC NECK PAIN: ICD-10-CM

## 2024-08-06 PROCEDURE — 72040 X-RAY EXAM NECK SPINE 2-3 VW: CPT | Mod: TC,FY | Performed by: PHYSICIAN ASSISTANT

## 2024-08-06 PROCEDURE — 3074F SYST BP LT 130 MM HG: CPT | Performed by: PHYSICIAN ASSISTANT

## 2024-08-06 PROCEDURE — 99214 OFFICE O/P EST MOD 30 MIN: CPT | Performed by: PHYSICIAN ASSISTANT

## 2024-08-06 PROCEDURE — 72070 X-RAY EXAM THORAC SPINE 2VWS: CPT | Mod: TC,FY | Performed by: PHYSICIAN ASSISTANT

## 2024-08-06 PROCEDURE — 3079F DIAST BP 80-89 MM HG: CPT | Performed by: PHYSICIAN ASSISTANT

## 2024-08-06 RX ORDER — NAPROXEN 500 MG/1
500 TABLET ORAL 2 TIMES DAILY WITH MEALS
Qty: 30 TABLET | Refills: 0 | Status: SHIPPED | OUTPATIENT
Start: 2024-08-06

## 2024-08-06 RX ORDER — LOSARTAN POTASSIUM AND HYDROCHLOROTHIAZIDE 12.5; 5 MG/1; MG/1
1 TABLET ORAL DAILY
COMMUNITY
Start: 2024-06-14

## 2024-08-06 RX ORDER — METHYLPREDNISOLONE 4 MG
4 TABLET, DOSE PACK ORAL DAILY
Qty: 21 TABLET | Refills: 0 | Status: SHIPPED | OUTPATIENT
Start: 2024-08-06

## 2024-08-06 ASSESSMENT — ENCOUNTER SYMPTOMS
HEADACHES: 1
DIZZINESS: 0
TINGLING: 0
BACK PAIN: 1
WEAKNESS: 0
NECK PAIN: 1
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
## 2024-08-06 NOTE — LETTER
[place]  [facility] [address] [address]     August 6, 2024    Patient: Tyron Milligan   YOB: 1975   Date of Visit: 8/6/2024       To Whom It May Concern:    Tyron Milligan was seen and treated in our department on 8/6/2024.  Please excuse from work on 8/4/2024-8/6/2024    Sincerely,     Humberto Mullins P.A.-C.

## 2024-08-09 DIAGNOSIS — G89.29 CHRONIC NECK PAIN: ICD-10-CM

## 2024-08-09 DIAGNOSIS — M47.9 SPONDYLOSIS: ICD-10-CM

## 2024-08-09 DIAGNOSIS — G89.29 CHRONIC BILATERAL THORACIC BACK PAIN: ICD-10-CM

## 2024-08-09 DIAGNOSIS — M54.6 CHRONIC BILATERAL THORACIC BACK PAIN: ICD-10-CM

## 2024-08-09 DIAGNOSIS — M54.2 CHRONIC NECK PAIN: ICD-10-CM

## 2024-08-22 ENCOUNTER — SUPERVISING PHYSICIAN REVIEW (OUTPATIENT)
Dept: URGENT CARE | Facility: CLINIC | Age: 49
End: 2024-08-22
Payer: COMMERCIAL